# Patient Record
Sex: FEMALE | Race: WHITE | NOT HISPANIC OR LATINO | URBAN - METROPOLITAN AREA
[De-identification: names, ages, dates, MRNs, and addresses within clinical notes are randomized per-mention and may not be internally consistent; named-entity substitution may affect disease eponyms.]

---

## 2022-08-11 ENCOUNTER — EMERGENCY (EMERGENCY)
Facility: HOSPITAL | Age: 37
LOS: 1 days | Discharge: HOME | End: 2022-08-11
Attending: EMERGENCY MEDICINE | Admitting: EMERGENCY MEDICINE

## 2022-08-11 ENCOUNTER — EMERGENCY (EMERGENCY)
Facility: HOSPITAL | Age: 37
LOS: 1 days | Discharge: ANOTHER HOSPITAL | End: 2022-08-11
Attending: EMERGENCY MEDICINE
Payer: MEDICAID

## 2022-08-11 VITALS
SYSTOLIC BLOOD PRESSURE: 102 MMHG | DIASTOLIC BLOOD PRESSURE: 69 MMHG | RESPIRATION RATE: 20 BRPM | TEMPERATURE: 98 F | HEIGHT: 66 IN | OXYGEN SATURATION: 98 % | HEART RATE: 92 BPM

## 2022-08-11 VITALS
OXYGEN SATURATION: 98 % | HEART RATE: 105 BPM | HEIGHT: 66 IN | SYSTOLIC BLOOD PRESSURE: 132 MMHG | RESPIRATION RATE: 19 BRPM | TEMPERATURE: 99 F | WEIGHT: 156.97 LBS | DIASTOLIC BLOOD PRESSURE: 97 MMHG

## 2022-08-11 VITALS
RESPIRATION RATE: 18 BRPM | HEART RATE: 87 BPM | TEMPERATURE: 98 F | DIASTOLIC BLOOD PRESSURE: 69 MMHG | OXYGEN SATURATION: 98 % | SYSTOLIC BLOOD PRESSURE: 102 MMHG

## 2022-08-11 LAB
ALBUMIN SERPL ELPH-MCNC: 3.8 G/DL — SIGNIFICANT CHANGE UP (ref 3.5–5)
ALP SERPL-CCNC: 95 U/L — SIGNIFICANT CHANGE UP (ref 40–120)
ALT FLD-CCNC: 21 U/L DA — SIGNIFICANT CHANGE UP (ref 10–60)
ANION GAP SERPL CALC-SCNC: 9 MMOL/L — SIGNIFICANT CHANGE UP (ref 5–17)
APTT BLD: 30.7 SEC — SIGNIFICANT CHANGE UP (ref 27.5–35.5)
AST SERPL-CCNC: 15 U/L — SIGNIFICANT CHANGE UP (ref 10–40)
BASOPHILS # BLD AUTO: 0.04 K/UL — SIGNIFICANT CHANGE UP (ref 0–0.2)
BASOPHILS NFR BLD AUTO: 0.2 % — SIGNIFICANT CHANGE UP (ref 0–2)
BILIRUB SERPL-MCNC: 0.4 MG/DL — SIGNIFICANT CHANGE UP (ref 0.2–1.2)
BLD GP AB SCN SERPL QL: SIGNIFICANT CHANGE UP
BUN SERPL-MCNC: 10 MG/DL — SIGNIFICANT CHANGE UP (ref 7–18)
CALCIUM SERPL-MCNC: 9.3 MG/DL — SIGNIFICANT CHANGE UP (ref 8.4–10.5)
CHLORIDE SERPL-SCNC: 103 MMOL/L — SIGNIFICANT CHANGE UP (ref 96–108)
CO2 SERPL-SCNC: 24 MMOL/L — SIGNIFICANT CHANGE UP (ref 22–31)
CREAT SERPL-MCNC: 0.91 MG/DL — SIGNIFICANT CHANGE UP (ref 0.5–1.3)
EGFR: 84 ML/MIN/1.73M2 — SIGNIFICANT CHANGE UP
EOSINOPHIL # BLD AUTO: 0.04 K/UL — SIGNIFICANT CHANGE UP (ref 0–0.5)
EOSINOPHIL NFR BLD AUTO: 0.2 % — SIGNIFICANT CHANGE UP (ref 0–6)
GLUCOSE SERPL-MCNC: 98 MG/DL — SIGNIFICANT CHANGE UP (ref 70–99)
HCG SERPL-ACNC: <1 MIU/ML — SIGNIFICANT CHANGE UP
HCT VFR BLD CALC: 39.8 % — SIGNIFICANT CHANGE UP (ref 34.5–45)
HGB BLD-MCNC: 12.5 G/DL — SIGNIFICANT CHANGE UP (ref 11.5–15.5)
IMM GRANULOCYTES NFR BLD AUTO: 0.6 % — SIGNIFICANT CHANGE UP (ref 0–1.5)
INR BLD: 1.17 RATIO — HIGH (ref 0.88–1.16)
LACTATE SERPL-SCNC: 0.9 MMOL/L — SIGNIFICANT CHANGE UP (ref 0.7–2)
LYMPHOCYTES # BLD AUTO: 1.7 K/UL — SIGNIFICANT CHANGE UP (ref 1–3.3)
LYMPHOCYTES # BLD AUTO: 9.8 % — LOW (ref 13–44)
MCHC RBC-ENTMCNC: 24.3 PG — LOW (ref 27–34)
MCHC RBC-ENTMCNC: 31.4 GM/DL — LOW (ref 32–36)
MCV RBC AUTO: 77.3 FL — LOW (ref 80–100)
MONOCYTES # BLD AUTO: 0.88 K/UL — SIGNIFICANT CHANGE UP (ref 0–0.9)
MONOCYTES NFR BLD AUTO: 5.1 % — SIGNIFICANT CHANGE UP (ref 2–14)
NEUTROPHILS # BLD AUTO: 14.55 K/UL — HIGH (ref 1.8–7.4)
NEUTROPHILS NFR BLD AUTO: 84.1 % — HIGH (ref 43–77)
NRBC # BLD: 0 /100 WBCS — SIGNIFICANT CHANGE UP (ref 0–0)
PLATELET # BLD AUTO: 474 K/UL — HIGH (ref 150–400)
POTASSIUM SERPL-MCNC: 3.9 MMOL/L — SIGNIFICANT CHANGE UP (ref 3.5–5.3)
POTASSIUM SERPL-SCNC: 3.9 MMOL/L — SIGNIFICANT CHANGE UP (ref 3.5–5.3)
PROT SERPL-MCNC: 9.2 G/DL — HIGH (ref 6–8.3)
PROTHROM AB SERPL-ACNC: 14 SEC — HIGH (ref 10.5–13.4)
RBC # BLD: 5.15 M/UL — SIGNIFICANT CHANGE UP (ref 3.8–5.2)
RBC # FLD: 14.1 % — SIGNIFICANT CHANGE UP (ref 10.3–14.5)
SARS-COV-2 RNA SPEC QL NAA+PROBE: SIGNIFICANT CHANGE UP
SODIUM SERPL-SCNC: 136 MMOL/L — SIGNIFICANT CHANGE UP (ref 135–145)
WBC # BLD: 17.31 K/UL — HIGH (ref 3.8–10.5)
WBC # FLD AUTO: 17.31 K/UL — HIGH (ref 3.8–10.5)

## 2022-08-11 PROCEDURE — 87635 SARS-COV-2 COVID-19 AMP PRB: CPT

## 2022-08-11 PROCEDURE — 36415 COLL VENOUS BLD VENIPUNCTURE: CPT

## 2022-08-11 PROCEDURE — 86900 BLOOD TYPING SEROLOGIC ABO: CPT

## 2022-08-11 PROCEDURE — 86850 RBC ANTIBODY SCREEN: CPT

## 2022-08-11 PROCEDURE — 85730 THROMBOPLASTIN TIME PARTIAL: CPT

## 2022-08-11 PROCEDURE — 83605 ASSAY OF LACTIC ACID: CPT

## 2022-08-11 PROCEDURE — 84702 CHORIONIC GONADOTROPIN TEST: CPT

## 2022-08-11 PROCEDURE — 96375 TX/PRO/DX INJ NEW DRUG ADDON: CPT

## 2022-08-11 PROCEDURE — 99285 EMERGENCY DEPT VISIT HI MDM: CPT | Mod: 25

## 2022-08-11 PROCEDURE — 85025 COMPLETE CBC W/AUTO DIFF WBC: CPT

## 2022-08-11 PROCEDURE — 99285 EMERGENCY DEPT VISIT HI MDM: CPT

## 2022-08-11 PROCEDURE — 85610 PROTHROMBIN TIME: CPT

## 2022-08-11 PROCEDURE — 87040 BLOOD CULTURE FOR BACTERIA: CPT

## 2022-08-11 PROCEDURE — 80053 COMPREHEN METABOLIC PANEL: CPT

## 2022-08-11 PROCEDURE — 86901 BLOOD TYPING SEROLOGIC RH(D): CPT

## 2022-08-11 PROCEDURE — 96365 THER/PROPH/DIAG IV INF INIT: CPT

## 2022-08-11 RX ORDER — ACETAMINOPHEN 500 MG
1000 TABLET ORAL ONCE
Refills: 0 | Status: COMPLETED | OUTPATIENT
Start: 2022-08-11 | End: 2022-08-11

## 2022-08-11 RX ORDER — KETOROLAC TROMETHAMINE 30 MG/ML
15 SYRINGE (ML) INJECTION ONCE
Refills: 0 | Status: DISCONTINUED | OUTPATIENT
Start: 2022-08-11 | End: 2022-08-11

## 2022-08-11 RX ORDER — DEXAMETHASONE 0.5 MG/5ML
6 ELIXIR ORAL ONCE
Refills: 0 | Status: COMPLETED | OUTPATIENT
Start: 2022-08-11 | End: 2022-08-11

## 2022-08-11 RX ORDER — DEXAMETHASONE 0.5 MG/5ML
10 ELIXIR ORAL ONCE
Refills: 0 | Status: DISCONTINUED | OUTPATIENT
Start: 2022-08-11 | End: 2022-08-11

## 2022-08-11 RX ORDER — SODIUM CHLORIDE 9 MG/ML
1000 INJECTION INTRAMUSCULAR; INTRAVENOUS; SUBCUTANEOUS ONCE
Refills: 0 | Status: COMPLETED | OUTPATIENT
Start: 2022-08-11 | End: 2022-08-11

## 2022-08-11 RX ADMIN — Medication 600 MILLIGRAM(S): at 20:21

## 2022-08-11 RX ADMIN — Medication 1000 MILLIGRAM(S): at 20:21

## 2022-08-11 RX ADMIN — Medication 100 MILLIGRAM(S): at 19:29

## 2022-08-11 RX ADMIN — Medication 15 MILLIGRAM(S): at 23:56

## 2022-08-11 RX ADMIN — Medication 15 MILLIGRAM(S): at 23:09

## 2022-08-11 RX ADMIN — SODIUM CHLORIDE 1000 MILLILITER(S): 9 INJECTION INTRAMUSCULAR; INTRAVENOUS; SUBCUTANEOUS at 20:21

## 2022-08-11 RX ADMIN — Medication 400 MILLIGRAM(S): at 19:29

## 2022-08-11 RX ADMIN — Medication 6 MILLIGRAM(S): at 19:30

## 2022-08-11 RX ADMIN — SODIUM CHLORIDE 1000 MILLILITER(S): 9 INJECTION INTRAMUSCULAR; INTRAVENOUS; SUBCUTANEOUS at 19:29

## 2022-08-11 NOTE — ED PROVIDER NOTE - OBJECTIVE STATEMENT
36 y/o female, no past history, presents for left peritonsillar abscess. States that she started having throat pain on Saturday w/ a fever of 104. Went to urgent care and then was referred to [facility] who did a CAT scan and told patient that she had a small peritonsillar abscess that was too small for drainage and was prescribed Clindamycin which was last taken at noon today. Patient states, since yesterday, she has had a rapid progression of her pain and feels that the swelling has now extended towards her left ear. Patient states that she has been getting chills, but has not had fever since Saturday. Patient states now she is unable to tolerate PO intake, however states she is still able to swallow her secretions. Patient is allergic to penicillin.

## 2022-08-11 NOTE — ED PROVIDER NOTE - CLINICAL SUMMARY MEDICAL DECISION MAKING FREE TEXT BOX
36 y/o female presents w/ peritonsillar abscess concerning for spread. Labs, repeat CT neck; will provide patient dose of steroids, meds for pain, fluids, antibiotics. Transfer center called. ENT doctor, Dr. Barnes, accepting of patient for transfer to [facility].

## 2022-08-11 NOTE — ED PROVIDER NOTE - ATTENDING CONTRIBUTION TO CARE
36yo F with no PMHx presenting to [facility] ED as transfer from [place] [facility] for ENT evaluation for peritonsillar abscess. Pt has been having left sided throat pain x 6days and pain with swallowing. She went to OSH, had CT imaging showing small PTA but had no I+D, was given PO clindamycin which she has been taking but with no relief, so she went to [place] ed today for further eval.  Pt given decadron and clinda prior to arrival. no fevers or sob    General: Patient alert in no apparent distress  Skin: Dry and intact  HEENT: Head atraumatic. Oral mucosa moist. mild trismus, +L peritonsillar swelling with uvular deviation to Right side   Eyes: Conjunctiva normal  Cardiac: Regular rhythm and rate. No pretibial edema b/l  Respiratory: Lungs clear b/l and symmetric. No respiratory distress. Able to speak in complete sentences.  Gastrointestinal: Abdomen soft, nondistended, nontender  Musculoskeletal: Moves all extremities spontaneously  Neurological: alert and oriented to person, place, and time  Psychiatric: Calm and cooperative    a/p  PTA  will consult ENT for I+D bedside  toradol for pain

## 2022-08-11 NOTE — ED ADULT NURSE NOTE - OBJECTIVE STATEMENT
Pt received in rm 28. Arrived as a transfer from [facility] for peritonsillar abscess, here for ENT consult. Endorses throat pain for 6x days, worse yesterday and today. Reports was febrile to 104F oral last Saturday and was seen in ED, given abx and dc. Denies PMH. A&Ox4, ambulatory at baseline. Breathing even and unlabored. No drooling noted, pt able to maintain secretion. Denies SOB. Arrived w/ 18G IV on RAC. MD at bedside for eval. Waiting for ENT eval

## 2022-08-11 NOTE — ED ADULT NURSE NOTE - NSIMPLEMENTINTERV_GEN_ALL_ED
Clear Implemented All Universal Safety Interventions:  [place] to call system. Call bell, personal items and telephone within reach. Instruct patient to call for assistance. Room bathroom lighting operational. Non-slip footwear when patient is off stretcher. Physically safe environment: no spills, clutter or unnecessary equipment. Stretcher in lowest position, wheels locked, appropriate side rails in place.

## 2022-08-11 NOTE — ED PROVIDER NOTE - OBJECTIVE STATEMENT
37yF with no PMH presents to the ED as a transfer from [facility] to see ENT for a PTA. Pt has had L throat pain with fever for 6 days. Pt orginally went to  and told to go to the ED and went to [facility] where small L PTA was dx on CT and pt was sent home on abx. Pt continued to have worsening pain and difficulty swallowing prompting presentation to [facility]. Denies SOB, CP, abdominal pain, voice changes, n/v/d, dysuria. Pt received Clindamycin and decadron at [facility].

## 2022-08-11 NOTE — ED PROVIDER NOTE - PHYSICAL EXAMINATION
GEN: Patient awake alert NAD.   HEENT: normocephalic, atraumatic, EOMI, L peritonsillar edema, uvula midline, L cheek edema/ttp, no mastoid ttp moist MM  CARDIAC: RRR, S1, S2, no murmur.   PULM: CTA B/L no wheeze, rhonchi, rales.   ABD: soft NT, ND, no rebound no guarding,   MSK: Moving all extremities, no edema.   NEURO: A&Ox3, no focal neurological deficits  SKIN: warm, dry, no rash.

## 2022-08-11 NOTE — ED PROVIDER NOTE - NS ED ROS FT
GENERAL: + fever, chills  EYES: no vision changes, no discharge.   ENT: +difficulty swallowing or speaking   CARDIAC: no chest pain/pressure, SOB, lower extremity swelling  PULMONARY: no cough, SOB  GI: no abdominal pain, n/v/d  : no dysuria, no hematuria  SKIN: no rashes, no ecchymosis  NEURO: no headache, lightheadedness  MSK: No joint pain, myalgia, weakness.

## 2022-08-11 NOTE — ED ADULT TRIAGE NOTE - CHIEF COMPLAINT QUOTE
Patient arrives as transfer from [facility] for peritonsillar abscess. No SOB, respiratory distress, drooling. Pain to throat. 18G IV to RAC. No PMHx.

## 2022-08-11 NOTE — ED PROVIDER NOTE - PHYSICAL EXAMINATION
ENT: large left peritonsillar abscess w/ uvular deviation towards the right; no tenderness to palpation under the tongue, ears are clear, TMs are normal, no drooling, no muffled voice ENT: large left peritonsillar abscess w/ uvular deviation towards the right; no tenderness to palpation under the tongue, ears are clear, TMs are normal, no drooling, no muffled voice. no trismus

## 2022-08-11 NOTE — ED ADULT NURSE NOTE - CHIEF COMPLAINT QUOTE
Patient arrives as transfer from [facility] for peritonsillar abscess. No SOB, respiratory distress, drooling. Pain to throat. 18G IV to RAC. No PMHx.

## 2022-08-11 NOTE — ED ADULT NURSE NOTE - OBJECTIVE STATEMENT
pt is  a 38 y/o female  with c/o  throat  pain since  saturday  was seen in  [facility]  and  [facility] on a  sat  was sent  home with antibiotics day  6   today  pt  w/ severe  ear and  throat  pain.

## 2022-08-11 NOTE — ED ADULT TRIAGE NOTE - CHIEF COMPLAINT QUOTE
c/o left sided throat and  ear pain since sat was seen in Urgent Care and [facility] on Saturday was sent home on antibiotics day 6 today c/o pain to left ear and throat

## 2022-08-11 NOTE — ED PROVIDER NOTE - CLINICAL SUMMARY MEDICAL DECISION MAKING FREE TEXT BOX
Chet Mendez, DO PGY-2: 37yF with no PMH presents to the ED as a transfer from [facility] to see ENT for a PTA. Pt has had L throat pain with fever for 6 days. Pt orginally went to  and told to go to the ED and went to [facility] where small L PTA was dx on CT and pt was sent home on abx. Pt continued to have worsening pain and difficulty swallowing [facility] Denies SOB, CP, abdominal pain, voice changes, n/v/d, dysuria. Pt has L PTA, will call ENT for Iand D. Pt [facility] decadron [facility]. Chet Mendez, DO PGY-2: 37yF with no PMH presents to the ED as a transfer from [facility] to see ENT for a PTA. Pt has had L throat pain with fever for 6 days. Pt orginally went to  and told to go to the ED and went to [facility] where small L PTA was dx on CT and pt was sent home on abx. Pt continued to have worsening pain and difficulty swallowing prompting presentation to [facility]. Denies SOB, CP, abdominal pain, voice changes, n/v/d, dysuria. Pt has L PTA, will call ENT for I and D. Pt receive clindamycin and decadron at [facility].

## 2022-08-12 VITALS
OXYGEN SATURATION: 100 % | HEART RATE: 86 BPM | RESPIRATION RATE: 16 BRPM | SYSTOLIC BLOOD PRESSURE: 100 MMHG | TEMPERATURE: 98 F | DIASTOLIC BLOOD PRESSURE: 62 MMHG

## 2022-08-12 PROCEDURE — 42700 I&D ABSCESS PERITONSILLAR: CPT | Mod: 54,LT

## 2022-08-12 PROCEDURE — 99234 HOSP IP/OBS SM DT SF/LOW 45: CPT | Mod: 25

## 2022-08-12 RX ORDER — SODIUM CHLORIDE 9 MG/ML
1000 INJECTION, SOLUTION INTRAVENOUS ONCE
Refills: 0 | Status: COMPLETED | OUTPATIENT
Start: 2022-08-12 | End: 2022-08-12

## 2022-08-12 RX ORDER — SODIUM CHLORIDE 9 MG/ML
1000 INJECTION, SOLUTION INTRAVENOUS
Refills: 0 | Status: DISCONTINUED | OUTPATIENT
Start: 2022-08-12 | End: 2022-08-12

## 2022-08-12 RX ORDER — KETOROLAC TROMETHAMINE 30 MG/ML
30 SYRINGE (ML) INJECTION EVERY 6 HOURS
Refills: 0 | Status: COMPLETED | OUTPATIENT
Start: 2022-08-12 | End: 2022-08-17

## 2022-08-12 RX ORDER — DEXAMETHASONE 0.5 MG/5ML
10 ELIXIR ORAL EVERY 8 HOURS
Refills: 0 | Status: COMPLETED | OUTPATIENT
Start: 2022-08-12 | End: 2022-08-12

## 2022-08-12 RX ADMIN — Medication 30 MILLIGRAM(S): at 06:38

## 2022-08-12 RX ADMIN — SODIUM CHLORIDE 125 MILLILITER(S): 9 INJECTION, SOLUTION INTRAVENOUS at 06:36

## 2022-08-12 RX ADMIN — Medication 100 MILLIGRAM(S): at 06:38

## 2022-08-12 RX ADMIN — Medication 100 MILLIGRAM(S): at 02:01

## 2022-08-12 RX ADMIN — Medication 102 MILLIGRAM(S): at 04:16

## 2022-08-12 RX ADMIN — SODIUM CHLORIDE 1000 MILLILITER(S): 9 INJECTION, SOLUTION INTRAVENOUS at 02:06

## 2022-08-12 RX ADMIN — Medication 30 MILLIGRAM(S): at 09:15

## 2022-08-12 RX ADMIN — Medication 10 MILLIGRAM(S): at 14:28

## 2022-08-12 RX ADMIN — Medication 30 MILLIGRAM(S): at 12:54

## 2022-08-12 RX ADMIN — Medication 102 MILLIGRAM(S): at 13:47

## 2022-08-12 RX ADMIN — Medication 100 MILLIGRAM(S): at 14:21

## 2022-08-12 NOTE — ED CDU PROVIDER INITIAL DAY NOTE - DETAILS
IV antibiotics, Decadron, IV hydration, supportive care, recs as per ENT team following pt; general observation care / monitoring.

## 2022-08-12 NOTE — CONSULT NOTE ADULT - SUBJECTIVE AND OBJECTIVE BOX
HPI:  Patient is a 37y Female with no pMH presents as a transfer from [facility] for PTA. Patient has had odynophagia, inability to tolerate PO since saturday, went to [facility] and was found to have small left PTA on CT and given clindamycin PO. Pt continued to have worsening pain and difficulty swallowing prompting presentation to [facility]. Endorses mild voice changes, decr neck ROM, fevers to 103/104 (before starting PO clinda).Denies trusmus, Denies SOB, rspiratory distress, prior episodes, HEENT surgery. Pt received Clindamycin and decadron at [facility].  Pt does not smoke or drink.   Of note patient has clindamycin allergy since childhood, was told by mom she gets a rash.       Physical Exam  T(C): 36.7 (08-12-22 @ 00:15), Max: 37.1 (08-11-22 @ 17:38)  HR: 83 (08-12-22 @ 00:15) (83 - 105)  BP: 95/67 (08-12-22 @ 00:19) (93/63 - 132/97)  RR: 18 (08-12-22 @ 00:15) (18 - 20)  SpO2: 98% (08-12-22 @ 00:15) (98% - 99%)  General: NAD, A+Ox3  No respiratory distress, stridor, or stertor  Voice quality: normal  Face:  Symmetric without masses or lesions  OU: PERRL, EOMI  AD: Pinna wnl, EAC clear  AS: Pinna wnl, EAC clear  Nose: nasal cavity clear bilaterally, inferior turbinates normal, mucosa normal without crusting or bleeding  OC/OP:Exophitic 4+ tonsils with L soft palate fluctuance and fullness.  tongue normal, floor of mouth wnl, no masses or lesions.  Neck:  L neck fullness and tender to palpation.     Procedure: PTA  Using a headlight for visualization, the patient was sprayed with hurricaine spray and then numbed with 3cc of 2% lidocaine w/epi. An 18 gauge needle was then used to aspirate purulent fluid from the left peritonsillar region. Cultures were sent. Using an 11 blade scalpel, an curvilinear incision was then made in the area of aspiration from lateral to medial. The incision was spread open with a clamp and hemostasis was obtained.

## 2022-08-12 NOTE — CONSULT NOTE ADULT - ASSESSMENT
38yo F with L PTA, failed medical management, s/p I&D wt minimal purulence expressed, plan for overnight admit in CDU [facility] clinda IV and decadron.  - admit in CDU [facility] clinda IV and decadron  - Dc in AM if improvement  - Advance as tolerated  - Dc on 14 days PO abx  - Follow up with [facility] outpatient- call [phone] to make appointment  - discussed [facility] Dr. Yoo

## 2022-08-12 NOTE — ED CDU PROVIDER DISPOSITION NOTE - ATTENDING CONTRIBUTION TO CARE
Pt is a 33yo F with no significant pmhx p/w weakness.  Pt was evaluated by outpatient neurologist, who directed pt to ED for evaluation.  In ED, pt had CT head which was negative for any acute pathology.  Pt was evaluated by neurology; they recommend MRIs.  MRIs with following impression: "MRI of the brain demonstrates no evidence acute hemorrhage mass mass effect. MRI of the spine demonstrates mild degenerative changes Nonspecific lesion is seen involving the C7 vertebral body as described above."  Pt's case and MRI results discussed with neurology; they recommend outpatient follow up with PMD, neurology and rheumatology.    Finding of nonspecific lesion involving C7 vertebral body discussed with patient; pt directed to have this further evaluated and monitored with pt's PMD on outpatient basis.   Pt was deemed medically stable for discharge with instructions to follow up with PMD, neuro and rheum.

## 2022-08-12 NOTE — ED CDU PROVIDER INITIAL DAY NOTE - ATTENDING APP SHARED VISIT CONTRIBUTION OF CARE
36 yo female, no stated PMH, transferred to [facility] from [facility] for peritonsillar abscess; pt rec'd IV clindamycin, NS bolus, Tylenol IV, Decadron 6 milligrams while at [facility].  Labs: WBC 17.31, INR 1.17, CMP unremarkable, BHCG <1, COVID NotDetected.  In this ED, VSS,  ENT was consulted and I&D was performed.  ENT advised continuation of IV clindamycin and Decadron regimen    pt was dispo'd to CDU for continued observation, pain management and IV antibiotics

## 2022-08-12 NOTE — ED CDU PROVIDER DISPOSITION NOTE - PATIENT PORTAL LINK FT
You can access the FollowMyHealth Patient Portal offered by [facility] by registering at the following website: [email or web address]. By joining [facility]’s FollowMyHealth portal, you will also be able to view your health information using other applications (apps) compatible with our system.

## 2022-08-12 NOTE — ED CDU PROVIDER DISPOSITION NOTE - CLINICAL COURSE
Pt is a 38 y/o F no pertinent PMHx transferred to [facility] for left sided PTA.  In ED, pt had I&D performed.  pt placed in CDU for decadron and antibiotics.  Pt developed improvement in pain.  Pt has been able to tolerate PO.  Pt was deemed medically stable for discharge with instructions to follow p with PMD and ENT.

## 2022-08-12 NOTE — ED ADULT NURSE REASSESSMENT NOTE - NS ED NURSE REASSESS COMMENT FT1
PT A&OX4.  No distress noted.  IV ABX started and tolerated well.  IVF in place.  Abcess culture sent.  PT denies pain at this time.  Will continue to monitor.

## 2022-08-12 NOTE — ED CDU PROVIDER DISPOSITION NOTE - NSFOLLOWUPINSTRUCTIONS_ED_ALL_ED_FT
Advance activity as tolerated.  Continue all previously prescribed medications as directed unless otherwise instructed.  Take Motrin (also sold as Advil or Ibuprofen) 400-600 mg (two or three 200 mg over the counter pills) every 8 hours as needed for moderate pain or fevers-- take with food. Take Tylenol 650mg (Two 325 mg pills) every 4-6 hours as needed for pain or fevers.  Take Clindamycin 150 mg three pills three times a day for 14 days.  Follow up with your primary care physician and ENT (call [phone] to make appointment) in 48-72 hours- bring copies of your results.  Return to the ER for worsening or persistent symptoms, including but not limited to worsening/persistent pain, fevers, difficulty breathing, difficulty swallowing, and/or ANY NEW OR CONCERNING SYMPTOMS. If you have issues obtaining follow up, please call: [phone] to obtain a doctor or specialist who takes your insurance in your area.  You may call [phone] to make an appointment with the internal medicine clinic.

## 2022-08-12 NOTE — ED CDU PROVIDER INITIAL DAY NOTE - PROGRESS NOTE DETAILS
Pt reports pain profoundly improved.  Pt tolerating PO.  Case discussed with ENT; they recommend clindamycin x 2 weeks; they do not recommend discharging on steroids.  pt medically stable for discharge.  Strict return precautions given.  Pt to follow up with PMD, ENT.  Reassessment performed and plan for discharge discussed with Dr. Landry who agrees with disposition and discharge plan.

## 2022-08-12 NOTE — ED CDU PROVIDER INITIAL DAY NOTE - PHYSICAL EXAMINATION
CONSTITUTIONAL:  Well appearing, awake, alert, oriented to person, place, time/situation and in no apparent distress.  Pt. is objectively comfortable appearing and verbalizing in effortless sentences.  ENMT: NC/AT.  Airway patent.  Nasal mucosa clear.  Moist mucous membranes.  S/P I&D left PTA; no active bleeding.  Neck supple.  EYES:  Clear OU.  CARDIAC:  Normal rate, regular rhythm.  Heart sounds S1 S2.  No murmurs, gallops, or rubs.  RESPIRATORY:  Breath sounds clear and equal bilaterally.  No wheezes, no rales, no rhonchi.  GASTROINTESTINAL:  Abdomen soft, non-distended, non-tender.  No rebound, no guarding.  NEUROLOGICAL:  Alert and oriented to person/place/time/situation.  No focal deficits; no tremors noted.   MUSCULOSKELETAL:  Range of motion is not limited.    SKIN:  Skin color unremarkable.  Skin warm, dry.  PSYCHIATRIC:  Alert and oriented to person/place/time/situation.  Mood and affect WNL.  No apparent risk to self or others. CONSTITUTIONAL:  Well appearing, awake, alert, oriented to person, place, time/situation and in no apparent distress.  Pt. is objectively comfortable appearing and verbalizing in effortless sentences.  ENMT: NC/AT.  Airway patent.  Nasal mucosa clear.  Moist mucous membranes.  Left posterior soft palate / peritonsillar inflammation/fullness; uvula is midline; pt is s/p I&D left PTA; no active bleeding or discharge from incision site.  Neck supple.  EYES:  Clear OU.  CARDIAC:  Normal rate, regular rhythm.  Heart sounds S1 S2.  No murmurs, gallops, or rubs.  RESPIRATORY:  Breath sounds clear and equal bilaterally.  No wheezes, no rales, no rhonchi.  GASTROINTESTINAL:  Abdomen soft, non-distended, non-tender.  No rebound, no guarding.  NEUROLOGICAL:  Alert and oriented to person/place/time/situation.  No focal deficits; no tremors noted.   MUSCULOSKELETAL:  Range of motion is not limited.    SKIN:  Skin color unremarkable.  Skin warm, dry.  PSYCHIATRIC:  Alert and oriented to person/place/time/situation.  Mood and affect WNL.  No apparent risk to self or others.

## 2022-08-12 NOTE — ED CDU PROVIDER INITIAL DAY NOTE - OBJECTIVE STATEMENT
37yF with no PMH presents to the ED as a transfer from [facility] to see ENT for a PTA. Pt has had L throat pain with fever for 6 days. Pt orginally went to  and told to go to the ED and went to [facility] where small L PTA was dx on CT and pt was sent home on abx. Pt continued to have worsening pain and difficulty swallowing prompting presentation to [facility]. Denies SOB, CP, abdominal pain, voice changes, n/v/d, dysuria. Pt received Clindamycin and decadron at [facility].    CDU SIL[place]RE [place]es Note------  ED Provider HPI as above, reviewed.  Pt. is a 38 yo female, no stated PMH, transferred to [facility] from [facility] for peritonsillar abscess; pt rec'd clindamycin, NS bolus, Tylenol IV, Decadron 37yF with no PMH presents to the ED as a transfer from [facility] to see ENT for a PTA. Pt has had L throat pain with fever for 6 days. Pt orginally went to  and told to go to the ED and went to [facility] where small L PTA was dx on CT and pt was sent home on abx. Pt continued to have worsening pain and difficulty swallowing prompting presentation to [facility]. Denies SOB, CP, abdominal pain, voice changes, n/v/d, dysuria. Pt received Clindamycin and decadron at [facility].    CDU [place] Note------  ED Provider HPI as above, reviewed.  Pt. is a 38 yo female, no stated PMH, transferred to [facility] from [facility] for peritonsillar abscess; pt rec'd IV clindamycin, NS bolus, Tylenol IV, Decadron 6 milligrams while at [facility].  Labs: WBC 17.31, INR 1.17, CMP unremarkable, BHCG <1, COVID NotDetected.  In this ED, VSS,  ENT was consulted and I&D was performed.  ENT advised continuation of IV clindamycin and Decadron regimen; pt was dispo'd to CDU accordingly.

## 2022-08-12 NOTE — ED ADULT NURSE REASSESSMENT NOTE - NS ED NURSE REASSESS COMMENT FT1
pt resting in bed, lowest position.. pt reports symptom relief. breathing even and unlabored. plan for d/c once IV abx infused. stable for dc.

## 2022-08-14 LAB
CULTURE RESULTS: SIGNIFICANT CHANGE UP
SPECIMEN SOURCE: SIGNIFICANT CHANGE UP

## 2022-08-16 LAB
CULTURE RESULTS: SIGNIFICANT CHANGE UP
CULTURE RESULTS: SIGNIFICANT CHANGE UP
SPECIMEN SOURCE: SIGNIFICANT CHANGE UP
SPECIMEN SOURCE: SIGNIFICANT CHANGE UP